# Patient Record
Sex: FEMALE | Race: WHITE | NOT HISPANIC OR LATINO | Employment: FULL TIME | URBAN - METROPOLITAN AREA
[De-identification: names, ages, dates, MRNs, and addresses within clinical notes are randomized per-mention and may not be internally consistent; named-entity substitution may affect disease eponyms.]

---

## 2023-05-25 ENCOUNTER — LAB (OUTPATIENT)
Dept: LAB | Facility: HOSPITAL | Age: 48
End: 2023-05-25
Payer: COMMERCIAL

## 2023-05-25 ENCOUNTER — OFFICE VISIT (OUTPATIENT)
Dept: FAMILY MEDICINE CLINIC | Facility: CLINIC | Age: 48
End: 2023-05-25
Payer: COMMERCIAL

## 2023-05-25 VITALS
HEIGHT: 67 IN | BODY MASS INDEX: 41.75 KG/M2 | TEMPERATURE: 97.5 F | HEART RATE: 106 BPM | RESPIRATION RATE: 16 BRPM | DIASTOLIC BLOOD PRESSURE: 88 MMHG | OXYGEN SATURATION: 99 % | SYSTOLIC BLOOD PRESSURE: 138 MMHG | WEIGHT: 266 LBS

## 2023-05-25 DIAGNOSIS — Z00.00 ANNUAL PHYSICAL EXAM: Primary | ICD-10-CM

## 2023-05-25 DIAGNOSIS — Z12.31 ENCOUNTER FOR SCREENING MAMMOGRAM FOR MALIGNANT NEOPLASM OF BREAST: ICD-10-CM

## 2023-05-25 DIAGNOSIS — Z11.59 NEED FOR HEPATITIS C SCREENING TEST: ICD-10-CM

## 2023-05-25 DIAGNOSIS — Z00.00 ANNUAL PHYSICAL EXAM: ICD-10-CM

## 2023-05-25 DIAGNOSIS — E66.01 MORBID (SEVERE) OBESITY DUE TO EXCESS CALORIES: ICD-10-CM

## 2023-05-25 DIAGNOSIS — Z76.89 ENCOUNTER TO ESTABLISH CARE: ICD-10-CM

## 2023-05-25 DIAGNOSIS — Z12.11 COLON CANCER SCREENING: ICD-10-CM

## 2023-05-25 LAB
ALBUMIN SERPL-MCNC: 4.2 G/DL (ref 3.5–5.2)
ALBUMIN/GLOB SERPL: 1.4 G/DL
ALP SERPL-CCNC: 82 U/L (ref 39–117)
ALT SERPL W P-5'-P-CCNC: 13 U/L (ref 1–33)
ANION GAP SERPL CALCULATED.3IONS-SCNC: 10.3 MMOL/L (ref 5–15)
AST SERPL-CCNC: 14 U/L (ref 1–32)
BASOPHILS # BLD AUTO: 0.04 10*3/MM3 (ref 0–0.2)
BASOPHILS NFR BLD AUTO: 0.6 % (ref 0–1.5)
BILIRUB SERPL-MCNC: 0.5 MG/DL (ref 0–1.2)
BUN SERPL-MCNC: 12 MG/DL (ref 6–20)
BUN/CREAT SERPL: 13.6 (ref 7–25)
CALCIUM SPEC-SCNC: 9.5 MG/DL (ref 8.6–10.5)
CHLORIDE SERPL-SCNC: 103 MMOL/L (ref 98–107)
CHOLEST SERPL-MCNC: 167 MG/DL (ref 0–200)
CO2 SERPL-SCNC: 23.7 MMOL/L (ref 22–29)
CREAT SERPL-MCNC: 0.88 MG/DL (ref 0.57–1)
DEPRECATED RDW RBC AUTO: 39.3 FL (ref 37–54)
EGFRCR SERPLBLD CKD-EPI 2021: 82.2 ML/MIN/1.73
EOSINOPHIL # BLD AUTO: 0.17 10*3/MM3 (ref 0–0.4)
EOSINOPHIL NFR BLD AUTO: 2.6 % (ref 0.3–6.2)
ERYTHROCYTE [DISTWIDTH] IN BLOOD BY AUTOMATED COUNT: 12.7 % (ref 12.3–15.4)
GLOBULIN UR ELPH-MCNC: 2.9 GM/DL
GLUCOSE SERPL-MCNC: 110 MG/DL (ref 65–99)
HBA1C MFR BLD: 5.5 % (ref 4.8–5.6)
HCT VFR BLD AUTO: 39.8 % (ref 34–46.6)
HCV AB SER DONR QL: NORMAL
HDLC SERPL-MCNC: 36 MG/DL (ref 40–60)
HGB BLD-MCNC: 13.6 G/DL (ref 12–15.9)
HIV1+2 AB SER QL: NORMAL
IMM GRANULOCYTES # BLD AUTO: 0.03 10*3/MM3 (ref 0–0.05)
IMM GRANULOCYTES NFR BLD AUTO: 0.5 % (ref 0–0.5)
LDLC SERPL CALC-MCNC: 109 MG/DL (ref 0–100)
LDLC/HDLC SERPL: 2.97 {RATIO}
LYMPHOCYTES # BLD AUTO: 1.45 10*3/MM3 (ref 0.7–3.1)
LYMPHOCYTES NFR BLD AUTO: 22.1 % (ref 19.6–45.3)
MCH RBC QN AUTO: 29.4 PG (ref 26.6–33)
MCHC RBC AUTO-ENTMCNC: 34.2 G/DL (ref 31.5–35.7)
MCV RBC AUTO: 86.1 FL (ref 79–97)
MONOCYTES # BLD AUTO: 0.38 10*3/MM3 (ref 0.1–0.9)
MONOCYTES NFR BLD AUTO: 5.8 % (ref 5–12)
NEUTROPHILS NFR BLD AUTO: 4.49 10*3/MM3 (ref 1.7–7)
NEUTROPHILS NFR BLD AUTO: 68.4 % (ref 42.7–76)
NRBC BLD AUTO-RTO: 0 /100 WBC (ref 0–0.2)
PLATELET # BLD AUTO: 264 10*3/MM3 (ref 140–450)
PMV BLD AUTO: 11.5 FL (ref 6–12)
POTASSIUM SERPL-SCNC: 4.3 MMOL/L (ref 3.5–5.2)
PROT SERPL-MCNC: 7.1 G/DL (ref 6–8.5)
RBC # BLD AUTO: 4.62 10*6/MM3 (ref 3.77–5.28)
SODIUM SERPL-SCNC: 137 MMOL/L (ref 136–145)
TRIGL SERPL-MCNC: 121 MG/DL (ref 0–150)
TSH SERPL DL<=0.05 MIU/L-ACNC: 1.57 UIU/ML (ref 0.27–4.2)
VIT B12 BLD-MCNC: 302 PG/ML (ref 211–946)
VLDLC SERPL-MCNC: 22 MG/DL (ref 5–40)
WBC NRBC COR # BLD: 6.56 10*3/MM3 (ref 3.4–10.8)

## 2023-05-25 PROCEDURE — 86803 HEPATITIS C AB TEST: CPT

## 2023-05-25 PROCEDURE — 80061 LIPID PANEL: CPT

## 2023-05-25 PROCEDURE — 85025 COMPLETE CBC W/AUTO DIFF WBC: CPT

## 2023-05-25 PROCEDURE — 83036 HEMOGLOBIN GLYCOSYLATED A1C: CPT

## 2023-05-25 PROCEDURE — 36415 COLL VENOUS BLD VENIPUNCTURE: CPT

## 2023-05-25 PROCEDURE — 82607 VITAMIN B-12: CPT

## 2023-05-25 PROCEDURE — 84443 ASSAY THYROID STIM HORMONE: CPT

## 2023-05-25 PROCEDURE — G0432 EIA HIV-1/HIV-2 SCREEN: HCPCS | Performed by: STUDENT IN AN ORGANIZED HEALTH CARE EDUCATION/TRAINING PROGRAM

## 2023-05-25 PROCEDURE — 80053 COMPREHEN METABOLIC PANEL: CPT

## 2023-05-25 RX ORDER — DOXYCYCLINE 50 MG/1
CAPSULE ORAL
COMMUNITY
Start: 2023-05-09 | End: 2023-05-25

## 2023-05-25 NOTE — PROGRESS NOTES
"Chief Complaint    Establishing care for annual physical  Subjective         Delores Villarreal is a 48 y.o. female who presents to [facility]    48 years old comes to the clinic today to establish care for annual physical.    Patient is physically very active without any chest pain or shortness of breath.    Currently not on any prescription medication, patient does not have any medical conditions.    Patient does report her concerns with her weight and she has been trying very hard with diet and exercise to control but so far not so much success.    12+ review of systems are unremarkable    Objective   Vital Signs:   Vitals:    05/25/23 0750   BP: 138/88   BP Location: Left arm   Patient Position: Sitting   Cuff Size: Large Adult   Pulse: 106   Resp: 16   Temp: 97.5 °F (36.4 °C)   TempSrc: Temporal   SpO2: 99%   Weight: 121 kg (266 lb)   Height: 170.2 cm (67\")      Body mass index is 41.66 kg/m².   Wt Readings from Last 3 Encounters:   05/25/23 121 kg (266 lb)      BP Readings from Last 3 Encounters:   05/25/23 138/88        Patient Care Team:  Marsha Witt MD as PCP - General (Family Medicine)     Physical Exam  Vitals reviewed.   Constitutional:       Appearance: Normal appearance. She is well-developed.   HENT:      Head: Normocephalic and atraumatic.      Right Ear: External ear normal.      Left Ear: External ear normal.      Mouth/Throat:      Pharynx: No oropharyngeal exudate.   Eyes:      Conjunctiva/sclera: Conjunctivae normal.      Pupils: Pupils are equal, round, and reactive to light.   Cardiovascular:      Rate and Rhythm: Normal rate and regular rhythm.      Heart sounds: No murmur heard.    No friction rub. No gallop.   Pulmonary:      Effort: Pulmonary effort is normal.      Breath sounds: Normal breath sounds. No wheezing or rhonchi.   Abdominal:      General: Bowel sounds are normal. There is no distension.      Palpations: Abdomen is soft.      Tenderness: There " is no abdominal tenderness.   Skin:     General: Skin is warm and dry.   Neurological:      Mental Status: She is alert and oriented to person, place, and time.      Cranial Nerves: No cranial nerve deficit.   Psychiatric:         Mood and Affect: Mood and affect normal.         Behavior: Behavior normal.         Thought Content: Thought content normal.         Judgment: Judgment normal.              Class 3 Severe Obesity (BMI >=40). Obesity-related health conditions include the following: obstructive sleep apnea, hypertension, coronary heart disease and diabetes mellitus. Obesity is unchanged. BMI is is above average; BMI management plan is completed. We discussed portion control and increasing exercise.         Assessment and Plan   Diagnoses and all orders for this visit:    1. Annual physical exam (Primary)  Comments:  Daily exercise and healthy diet recommended  Orders:  -     TSH Rfx On Abnormal To Free T4; Future  -     CBC & Differential; Future  -     Comprehensive Metabolic Panel; Future  -     Hemoglobin A1c; Future  -     Lipid Panel; Future  -     Vitamin B12; Future  -     HIV-1 / O / 2 Ag / Antibody 4th Generation    2. Encounter to establish care  -     TSH Rfx On Abnormal To Free T4; Future  -     CBC & Differential; Future  -     Comprehensive Metabolic Panel; Future  -     Hemoglobin A1c; Future  -     Lipid Panel; Future  -     Vitamin B12; Future  -     HIV-1 / O / 2 Ag / Antibody 4th Generation    3. Morbid (severe) obesity due to excess calories  Comments:  Lifestyle modifications discussed,  Orders:  -     TSH Rfx On Abnormal To Free T4; Future  -     CBC & Differential; Future  -     Comprehensive Metabolic Panel; Future  -     Hemoglobin A1c; Future  -     Lipid Panel; Future  -     Vitamin B12; Future  -     HIV-1 / O / 2 Ag / Antibody 4th Generation    4. Encounter for screening mammogram for malignant neoplasm of breast  -     Mammo Screening Digital Tomosynthesis Bilateral With CAD;  Future  -     TSH Rfx On Abnormal To Free T4; Future  -     CBC & Differential; Future  -     Comprehensive Metabolic Panel; Future  -     Hemoglobin A1c; Future  -     Lipid Panel; Future  -     Vitamin B12; Future  -     HIV-1 / O / 2 Ag / Antibody 4th Generation    5. Colon cancer screening  -     Cologuard - Stool, Per Rectum; Future  -     HIV-1 / O / 2 Ag / Antibody 4th Generation    6. Need for hepatitis C screening test  -     Hepatitis C Antibody; Future  -     HIV-1 / O / 2 Ag / Antibody 4th Generation          Tobacco Use: Unknown   • Smoking Tobacco Use: Never   • Smokeless Tobacco Use: Unknown   • Passive Exposure: Not on file            Follow Up   Return in about 1 year (around 5/25/2024) for Annual physical.  Patient was given instructions and counseling regarding her condition or for health maintenance advice. Please see specific information pulled into the AVS if appropriate.

## 2023-05-26 ENCOUNTER — PATIENT ROUNDING (BHMG ONLY) (OUTPATIENT)
Dept: FAMILY MEDICINE CLINIC | Facility: CLINIC | Age: 48
End: 2023-05-26
Payer: COMMERCIAL

## 2023-05-26 ENCOUNTER — CLINICAL SUPPORT (OUTPATIENT)
Dept: FAMILY MEDICINE CLINIC | Facility: CLINIC | Age: 48
End: 2023-05-26
Payer: COMMERCIAL

## 2023-05-26 DIAGNOSIS — E53.8 VITAMIN B12 DEFICIENCY: Primary | ICD-10-CM

## 2023-05-26 PROCEDURE — 96372 THER/PROPH/DIAG INJ SC/IM: CPT | Performed by: STUDENT IN AN ORGANIZED HEALTH CARE EDUCATION/TRAINING PROGRAM

## 2023-05-26 RX ORDER — CYANOCOBALAMIN 1000 UG/ML
1000 INJECTION, SOLUTION INTRAMUSCULAR; SUBCUTANEOUS
Status: SHIPPED | OUTPATIENT
Start: 2023-05-26

## 2023-05-26 RX ADMIN — CYANOCOBALAMIN 1000 MCG: 1000 INJECTION, SOLUTION INTRAMUSCULAR; SUBCUTANEOUS at 15:37

## 2023-06-02 ENCOUNTER — CLINICAL SUPPORT (OUTPATIENT)
Dept: FAMILY MEDICINE CLINIC | Facility: CLINIC | Age: 48
End: 2023-06-02

## 2023-06-02 DIAGNOSIS — E53.8 B12 DEFICIENCY: ICD-10-CM

## 2023-06-02 RX ADMIN — CYANOCOBALAMIN 1000 MCG: 1000 INJECTION, SOLUTION INTRAMUSCULAR; SUBCUTANEOUS at 09:51

## 2023-06-09 ENCOUNTER — CLINICAL SUPPORT (OUTPATIENT)
Dept: FAMILY MEDICINE CLINIC | Facility: CLINIC | Age: 48
End: 2023-06-09
Payer: COMMERCIAL

## 2023-06-09 DIAGNOSIS — E53.8 VITAMIN B12 DEFICIENCY: Primary | ICD-10-CM

## 2023-06-09 RX ADMIN — CYANOCOBALAMIN 1000 MCG: 1000 INJECTION, SOLUTION INTRAMUSCULAR; SUBCUTANEOUS at 15:42

## 2023-06-16 ENCOUNTER — CLINICAL SUPPORT (OUTPATIENT)
Dept: FAMILY MEDICINE CLINIC | Facility: CLINIC | Age: 48
End: 2023-06-16
Payer: COMMERCIAL

## 2023-06-16 DIAGNOSIS — E53.8 B12 DEFICIENCY: Primary | ICD-10-CM

## 2023-06-16 PROCEDURE — 96372 THER/PROPH/DIAG INJ SC/IM: CPT | Performed by: STUDENT IN AN ORGANIZED HEALTH CARE EDUCATION/TRAINING PROGRAM

## 2023-06-16 RX ADMIN — CYANOCOBALAMIN 1000 MCG: 1000 INJECTION, SOLUTION INTRAMUSCULAR; SUBCUTANEOUS at 14:14

## 2023-07-26 ENCOUNTER — HOSPITAL ENCOUNTER (OUTPATIENT)
Dept: MAMMOGRAPHY | Facility: HOSPITAL | Age: 48
Discharge: HOME | End: 2023-07-26
Payer: COMMERCIAL

## 2023-07-26 ENCOUNTER — HOSPITAL ENCOUNTER (OUTPATIENT)
Dept: ULTRASOUND IMAGING | Facility: HOSPITAL | Age: 48
Discharge: HOME | End: 2023-07-26
Payer: COMMERCIAL

## 2023-07-26 DIAGNOSIS — R92.8 ABNORMAL MAMMOGRAM: Primary | ICD-10-CM

## 2023-07-26 DIAGNOSIS — R92.8 ABNORMAL MAMMOGRAM OF BOTH BREASTS: ICD-10-CM

## 2023-07-26 PROCEDURE — G0279 TOMOSYNTHESIS, MAMMO: HCPCS

## 2023-07-26 PROCEDURE — 77066 DX MAMMO INCL CAD BI: CPT

## 2023-07-26 PROCEDURE — 76642 ULTRASOUND BREAST LIMITED: CPT

## 2024-01-22 ENCOUNTER — HOSPITAL ENCOUNTER (OUTPATIENT)
Dept: ULTRASOUND IMAGING | Facility: HOSPITAL | Age: 49
Discharge: HOME | End: 2024-01-22
Payer: COMMERCIAL

## 2024-01-22 ENCOUNTER — HOSPITAL ENCOUNTER (OUTPATIENT)
Dept: MAMMOGRAPHY | Facility: HOSPITAL | Age: 49
Discharge: HOME | End: 2024-01-22
Payer: COMMERCIAL

## 2024-01-22 DIAGNOSIS — R92.8 ABNORMAL MAMMOGRAM: ICD-10-CM

## 2024-01-22 PROCEDURE — 77066 DX MAMMO INCL CAD BI: CPT

## 2024-01-22 PROCEDURE — G0279 TOMOSYNTHESIS, MAMMO: HCPCS

## 2024-01-22 PROCEDURE — 76642 ULTRASOUND BREAST LIMITED: CPT

## 2024-01-29 ENCOUNTER — TELEPHONE (OUTPATIENT)
Dept: FAMILY MEDICINE CLINIC | Facility: CLINIC | Age: 49
End: 2024-01-29
Payer: COMMERCIAL

## 2024-01-29 NOTE — TELEPHONE ENCOUNTER
Lvmtcb     Needing to speak with patient regarding upcoming appointment on 1/30/24. Please transfer to office.

## 2024-01-30 ENCOUNTER — OFFICE VISIT (OUTPATIENT)
Dept: FAMILY MEDICINE CLINIC | Facility: CLINIC | Age: 49
End: 2024-01-30
Payer: COMMERCIAL

## 2024-01-30 VITALS
HEART RATE: 103 BPM | BODY MASS INDEX: 41.75 KG/M2 | HEIGHT: 67 IN | RESPIRATION RATE: 16 BRPM | TEMPERATURE: 96.9 F | OXYGEN SATURATION: 100 % | SYSTOLIC BLOOD PRESSURE: 126 MMHG | DIASTOLIC BLOOD PRESSURE: 90 MMHG | WEIGHT: 266 LBS

## 2024-01-30 DIAGNOSIS — Z82.49 FAMILY HISTORY OF HEART DISEASE: ICD-10-CM

## 2024-01-30 DIAGNOSIS — R92.8 ABNORMAL MAMMOGRAM OF RIGHT BREAST: ICD-10-CM

## 2024-01-30 DIAGNOSIS — E53.8 VITAMIN B 12 DEFICIENCY: ICD-10-CM

## 2024-01-30 DIAGNOSIS — F41.9 ANXIETY: Primary | ICD-10-CM

## 2024-01-30 DIAGNOSIS — Z00.00 ANNUAL PHYSICAL EXAM: ICD-10-CM

## 2024-01-30 RX ORDER — CHOLECALCIFEROL (VITAMIN D3) 125 MCG
500 CAPSULE ORAL DAILY
Qty: 90 TABLET | Refills: 1 | Status: SHIPPED | OUTPATIENT
Start: 2024-01-30

## 2024-01-30 RX ORDER — ASPIRIN 81 MG/1
81 TABLET ORAL DAILY
COMMUNITY

## 2024-01-30 RX ADMIN — CYANOCOBALAMIN 1000 MCG: 1000 INJECTION, SOLUTION INTRAMUSCULAR; SUBCUTANEOUS at 11:16

## 2024-01-30 NOTE — PROGRESS NOTES
"Chief Complaint  Anxiety and B12 deficiency     Subjective         Delores Villarreal is a 48 y.o. female who presents to [facility]    48 years old comes to the clinic today to follow-up.    Recently had abnormal diagnostic mammogram of right breast and biopsy scheduled.    Patient does have B12 deficiency, had taken few shots in the past but currently not taking any shots or any oral medication.    Patient does report some generalized anxiety and anxious mood due to lots of things and stress.  Would like to start something for anxiety as a trial.    Patient has a strong family history of cardiac disease.  Mother had 3 cardiac stents placed recently at age 70+ and that is giving patient lots of anxiety.  Patient would like to see cardiologist    12+ review of systems are unremarkable otherwise    Objective   Vital Signs:   Vitals:    01/30/24 1033   BP: 126/90   Pulse: 103   Resp: 16   Temp: 96.9 °F (36.1 °C)   SpO2: 100%   Weight: 121 kg (266 lb)   Height: 170.2 cm (67\")      Body mass index is 41.66 kg/m².   Wt Readings from Last 3 Encounters:   01/30/24 121 kg (266 lb)   05/25/23 121 kg (266 lb)      BP Readings from Last 3 Encounters:   01/30/24 126/90   05/25/23 138/88        Patient Care Team:  Marsha Witt MD as PCP - General (Family Medicine)     Physical Exam  Vitals reviewed.   Constitutional:       Appearance: Normal appearance. She is well-developed.   HENT:      Head: Normocephalic and atraumatic.      Right Ear: External ear normal.      Left Ear: External ear normal.      Mouth/Throat:      Pharynx: No oropharyngeal exudate.   Eyes:      Conjunctiva/sclera: Conjunctivae normal.      Pupils: Pupils are equal, round, and reactive to light.   Cardiovascular:      Rate and Rhythm: Normal rate and regular rhythm.      Heart sounds: No murmur heard.     No friction rub. No gallop.   Pulmonary:      Effort: Pulmonary effort is normal.      Breath sounds: Normal breath " sounds. No wheezing or rhonchi.   Abdominal:      General: Bowel sounds are normal. There is no distension.      Palpations: Abdomen is soft.      Tenderness: There is no abdominal tenderness.   Skin:     General: Skin is warm and dry.   Neurological:      Mental Status: She is alert and oriented to person, place, and time.      Cranial Nerves: No cranial nerve deficit.   Psychiatric:         Mood and Affect: Mood and affect normal.         Behavior: Behavior normal.         Thought Content: Thought content normal.         Judgment: Judgment normal.                            Assessment and Plan   Diagnoses and all orders for this visit:    1. Anxiety (Primary)  Comments:  Will start patient on Zoloft, counseling provided.  Medication reviewed, may titrate up if needed.  Patient to reach out if uncontrolled symptoms or no improv  Orders:  -     sertraline (Zoloft) 50 MG tablet; Take 1 tablet by mouth Daily.  Dispense: 30 tablet; Refill: 1    2. Vitamin B 12 deficiency  Comments:  Will prescribe oral B12  Orders:  -     vitamin B-12 (CYANOCOBALAMIN) 500 MCG tablet; Take 1 tablet by mouth Daily.  Dispense: 90 tablet; Refill: 1  -     TSH Rfx On Abnormal To Free T4; Future  -     CBC & Differential; Future  -     Comprehensive Metabolic Panel; Future  -     Hemoglobin A1c; Future  -     Lipid Panel; Future  -     Urinalysis With Microscopic - Urine, Clean Catch; Future    3. Abnormal mammogram of right breast  Comments:  Biopsy pending  Orders:  -     TSH Rfx On Abnormal To Free T4; Future  -     CBC & Differential; Future  -     Comprehensive Metabolic Panel; Future  -     Hemoglobin A1c; Future  -     Lipid Panel; Future  -     Urinalysis With Microscopic - Urine, Clean Catch; Future    4. Family history of heart disease  Comments:  Cardiology referral  Orders:  -     Ambulatory Referral to Cardiology  -     TSH Rfx On Abnormal To Free T4; Future  -     CBC & Differential; Future  -     Comprehensive Metabolic Panel;  Future  -     Hemoglobin A1c; Future  -     Lipid Panel; Future  -     Urinalysis With Microscopic - Urine, Clean Catch; Future    5. Annual physical exam  -     TSH Rfx On Abnormal To Free T4; Future  -     CBC & Differential; Future  -     Comprehensive Metabolic Panel; Future  -     Hemoglobin A1c; Future  -     Lipid Panel; Future  -     Urinalysis With Microscopic - Urine, Clean Catch; Future          Tobacco Use: Unknown (1/30/2024)    Patient History     Smoking Tobacco Use: Never     Smokeless Tobacco Use: Unknown     Passive Exposure: Not on file            Follow Up   Return in about 6 months (around 7/30/2024) for Next scheduled follow up.  Patient was given instructions and counseling regarding her condition or for health maintenance advice. Please see specific information pulled into the AVS if appropriate.

## 2024-02-05 ENCOUNTER — PATIENT OUTREACH (OUTPATIENT)
Dept: ONCOLOGY | Facility: HOSPITAL | Age: 49
End: 2024-02-05
Payer: COMMERCIAL

## 2024-02-05 ENCOUNTER — HOSPITAL ENCOUNTER (OUTPATIENT)
Dept: MAMMOGRAPHY | Facility: HOSPITAL | Age: 49
Discharge: HOME | End: 2024-02-05
Payer: COMMERCIAL

## 2024-02-05 DIAGNOSIS — N63.11 MASS OF UPPER OUTER QUADRANT OF RIGHT BREAST: ICD-10-CM

## 2024-02-05 PROCEDURE — 88305 TISSUE EXAM BY PATHOLOGIST: CPT | Performed by: STUDENT IN AN ORGANIZED HEALTH CARE EDUCATION/TRAINING PROGRAM

## 2024-02-05 PROCEDURE — 25010000002 LIDOCAINE 1 % SOLUTION: Performed by: STUDENT IN AN ORGANIZED HEALTH CARE EDUCATION/TRAINING PROGRAM

## 2024-02-05 PROCEDURE — A4648 IMPLANTABLE TISSUE MARKER: HCPCS

## 2024-02-05 RX ORDER — LIDOCAINE HYDROCHLORIDE AND EPINEPHRINE 10; 10 MG/ML; UG/ML
10 INJECTION, SOLUTION INFILTRATION; PERINEURAL ONCE
Status: COMPLETED | OUTPATIENT
Start: 2024-02-05 | End: 2024-02-05

## 2024-02-05 RX ORDER — LIDOCAINE HYDROCHLORIDE 10 MG/ML
10 INJECTION, SOLUTION INFILTRATION; PERINEURAL ONCE
Status: COMPLETED | OUTPATIENT
Start: 2024-02-05 | End: 2024-02-05

## 2024-02-05 RX ADMIN — LIDOCAINE HYDROCHLORIDE,EPINEPHRINE BITARTRATE 10 ML: 10; .01 INJECTION, SOLUTION INFILTRATION; PERINEURAL at 13:35

## 2024-02-05 RX ADMIN — LIDOCAINE HYDROCHLORIDE 10 ML: 10 INJECTION, SOLUTION INFILTRATION; PERINEURAL at 13:35

## 2024-02-06 LAB
CYTO UR: NORMAL
LAB AP CASE REPORT: NORMAL
LAB AP CLINICAL INFORMATION: NORMAL
PATH REPORT.FINAL DX SPEC: NORMAL
PATH REPORT.GROSS SPEC: NORMAL

## 2024-02-07 DIAGNOSIS — R92.8 ABNORMAL MAMMOGRAM OF BOTH BREASTS: ICD-10-CM

## 2024-02-07 DIAGNOSIS — D24.9 FIBROADENOMA OF BREAST, UNSPECIFIED LATERALITY: Primary | ICD-10-CM

## 2024-02-14 ENCOUNTER — PROCEDURE VISIT (OUTPATIENT)
Dept: FAMILY MEDICINE CLINIC | Facility: CLINIC | Age: 49
End: 2024-02-14
Payer: COMMERCIAL

## 2024-02-14 VITALS
TEMPERATURE: 98.3 F | HEART RATE: 110 BPM | OXYGEN SATURATION: 99 % | DIASTOLIC BLOOD PRESSURE: 84 MMHG | WEIGHT: 265.1 LBS | HEIGHT: 67 IN | BODY MASS INDEX: 41.61 KG/M2 | SYSTOLIC BLOOD PRESSURE: 132 MMHG

## 2024-02-14 DIAGNOSIS — Z01.419 WOMEN'S ANNUAL ROUTINE GYNECOLOGICAL EXAMINATION: Primary | ICD-10-CM

## 2024-02-14 DIAGNOSIS — E53.8 VITAMIN B 12 DEFICIENCY: ICD-10-CM

## 2024-02-14 DIAGNOSIS — N92.1 MENORRHAGIA WITH IRREGULAR CYCLE: ICD-10-CM

## 2024-02-14 LAB
CANDIDA SPECIES: NEGATIVE
GARDNERELLA VAGINALIS: POSITIVE
T VAGINALIS DNA VAG QL PROBE+SIG AMP: NEGATIVE

## 2024-02-14 PROCEDURE — 87510 GARDNER VAG DNA DIR PROBE: CPT

## 2024-02-14 PROCEDURE — 87480 CANDIDA DNA DIR PROBE: CPT

## 2024-02-14 PROCEDURE — 87660 TRICHOMONAS VAGIN DIR PROBE: CPT

## 2024-02-14 PROCEDURE — 87624 HPV HI-RISK TYP POOLED RSLT: CPT

## 2024-02-14 PROCEDURE — G0123 SCREEN CERV/VAG THIN LAYER: HCPCS

## 2024-02-14 RX ORDER — CYANOCOBALAMIN 1000 UG/ML
1000 INJECTION, SOLUTION INTRAMUSCULAR; SUBCUTANEOUS ONCE
Status: COMPLETED | OUTPATIENT
Start: 2024-02-14 | End: 2024-02-14

## 2024-02-14 RX ADMIN — CYANOCOBALAMIN 1000 MCG: 1000 INJECTION, SOLUTION INTRAMUSCULAR; SUBCUTANEOUS at 07:35

## 2024-02-15 RX ORDER — METRONIDAZOLE 500 MG/1
500 TABLET ORAL 2 TIMES DAILY
Qty: 14 TABLET | Refills: 0 | Status: SHIPPED | OUTPATIENT
Start: 2024-02-15

## 2024-02-15 RX ORDER — FLUCONAZOLE 150 MG/1
150 TABLET ORAL ONCE
Qty: 1 TABLET | Refills: 0 | Status: SHIPPED | OUTPATIENT
Start: 2024-02-15 | End: 2024-02-15

## 2024-02-17 LAB
CYTOLOGIST CVX/VAG CYTO: NORMAL
CYTOLOGY CVX/VAG DOC CYTO: NORMAL
CYTOLOGY CVX/VAG DOC THIN PREP: NORMAL
DX ICD CODE: NORMAL
HIV 1 & 2 AB SER-IMP: NORMAL
HPV I/H RISK 4 DNA CVX QL PROBE+SIG AMP: NEGATIVE
OTHER STN SPEC: NORMAL
STAT OF ADQ CVX/VAG CYTO-IMP: NORMAL

## 2024-03-12 ENCOUNTER — PATIENT MESSAGE (OUTPATIENT)
Dept: FAMILY MEDICINE CLINIC | Facility: CLINIC | Age: 49
End: 2024-03-12
Payer: COMMERCIAL

## 2024-03-12 NOTE — TELEPHONE ENCOUNTER
From: Delores Villarreal  To: Marsha Witt  Sent: 3/12/2024 10:59 AM EDT  Subject: Insurance     We may be switching Insurance companies with my husbands new job and I wanted to make sure you all accepted [employer] insurance?

## 2024-06-27 ENCOUNTER — OFFICE VISIT (OUTPATIENT)
Dept: FAMILY MEDICINE CLINIC | Facility: CLINIC | Age: 49
End: 2024-06-27
Payer: COMMERCIAL

## 2024-06-27 ENCOUNTER — LAB (OUTPATIENT)
Dept: LAB | Facility: HOSPITAL | Age: 49
End: 2024-06-27
Payer: COMMERCIAL

## 2024-06-27 VITALS
WEIGHT: 265 LBS | OXYGEN SATURATION: 99 % | BODY MASS INDEX: 41.59 KG/M2 | TEMPERATURE: 97.4 F | HEIGHT: 67 IN | DIASTOLIC BLOOD PRESSURE: 80 MMHG | SYSTOLIC BLOOD PRESSURE: 124 MMHG | HEART RATE: 100 BPM | RESPIRATION RATE: 16 BRPM

## 2024-06-27 DIAGNOSIS — E66.01 MORBID OBESITY: ICD-10-CM

## 2024-06-27 DIAGNOSIS — Z00.00 ANNUAL PHYSICAL EXAM: ICD-10-CM

## 2024-06-27 DIAGNOSIS — Z00.00 ANNUAL PHYSICAL EXAM: Primary | ICD-10-CM

## 2024-06-27 LAB
ALBUMIN SERPL-MCNC: 4.2 G/DL (ref 3.5–5.2)
ALBUMIN/GLOB SERPL: 1.4 G/DL
ALP SERPL-CCNC: 85 U/L (ref 39–117)
ALT SERPL W P-5'-P-CCNC: 12 U/L (ref 1–33)
ANION GAP SERPL CALCULATED.3IONS-SCNC: 12.2 MMOL/L (ref 5–15)
AST SERPL-CCNC: 12 U/L (ref 1–32)
BACTERIA UR QL AUTO: ABNORMAL /HPF
BASOPHILS # BLD AUTO: 0.04 10*3/MM3 (ref 0–0.2)
BASOPHILS NFR BLD AUTO: 0.6 % (ref 0–1.5)
BILIRUB SERPL-MCNC: 0.3 MG/DL (ref 0–1.2)
BILIRUB UR QL STRIP: NEGATIVE
BUN SERPL-MCNC: 14 MG/DL (ref 6–20)
BUN/CREAT SERPL: 15.2 (ref 7–25)
CALCIUM SPEC-SCNC: 9.4 MG/DL (ref 8.6–10.5)
CHLORIDE SERPL-SCNC: 104 MMOL/L (ref 98–107)
CHOLEST SERPL-MCNC: 179 MG/DL (ref 0–200)
CLARITY UR: CLEAR
CO2 SERPL-SCNC: 22.8 MMOL/L (ref 22–29)
COLOR UR: YELLOW
CREAT SERPL-MCNC: 0.92 MG/DL (ref 0.57–1)
DEPRECATED RDW RBC AUTO: 39.3 FL (ref 37–54)
EGFRCR SERPLBLD CKD-EPI 2021: 77.4 ML/MIN/1.73
EOSINOPHIL # BLD AUTO: 0.19 10*3/MM3 (ref 0–0.4)
EOSINOPHIL NFR BLD AUTO: 2.7 % (ref 0.3–6.2)
ERYTHROCYTE [DISTWIDTH] IN BLOOD BY AUTOMATED COUNT: 12.3 % (ref 12.3–15.4)
GLOBULIN UR ELPH-MCNC: 3.1 GM/DL
GLUCOSE SERPL-MCNC: 98 MG/DL (ref 65–99)
GLUCOSE UR STRIP-MCNC: NEGATIVE MG/DL
HBA1C MFR BLD: 5.6 % (ref 4.8–5.6)
HCT VFR BLD AUTO: 42.5 % (ref 34–46.6)
HDLC SERPL-MCNC: 45 MG/DL (ref 40–60)
HGB BLD-MCNC: 14.3 G/DL (ref 12–15.9)
HGB UR QL STRIP.AUTO: ABNORMAL
HYALINE CASTS UR QL AUTO: ABNORMAL /LPF
IMM GRANULOCYTES # BLD AUTO: 0.03 10*3/MM3 (ref 0–0.05)
IMM GRANULOCYTES NFR BLD AUTO: 0.4 % (ref 0–0.5)
KETONES UR QL STRIP: NEGATIVE
LDLC SERPL CALC-MCNC: 112 MG/DL (ref 0–100)
LDLC/HDLC SERPL: 2.44 {RATIO}
LEUKOCYTE ESTERASE UR QL STRIP.AUTO: ABNORMAL
LYMPHOCYTES # BLD AUTO: 1.77 10*3/MM3 (ref 0.7–3.1)
LYMPHOCYTES NFR BLD AUTO: 24.9 % (ref 19.6–45.3)
MCH RBC QN AUTO: 29.5 PG (ref 26.6–33)
MCHC RBC AUTO-ENTMCNC: 33.6 G/DL (ref 31.5–35.7)
MCV RBC AUTO: 87.8 FL (ref 79–97)
MONOCYTES # BLD AUTO: 0.43 10*3/MM3 (ref 0.1–0.9)
MONOCYTES NFR BLD AUTO: 6 % (ref 5–12)
NEUTROPHILS NFR BLD AUTO: 4.65 10*3/MM3 (ref 1.7–7)
NEUTROPHILS NFR BLD AUTO: 65.4 % (ref 42.7–76)
NITRITE UR QL STRIP: NEGATIVE
NRBC BLD AUTO-RTO: 0 /100 WBC (ref 0–0.2)
PH UR STRIP.AUTO: 6 [PH] (ref 5–8)
PLATELET # BLD AUTO: 294 10*3/MM3 (ref 140–450)
PMV BLD AUTO: 11.2 FL (ref 6–12)
POTASSIUM SERPL-SCNC: 4.3 MMOL/L (ref 3.5–5.2)
PROT SERPL-MCNC: 7.3 G/DL (ref 6–8.5)
PROT UR QL STRIP: NEGATIVE
RBC # BLD AUTO: 4.84 10*6/MM3 (ref 3.77–5.28)
RBC # UR STRIP: ABNORMAL /HPF
REF LAB TEST METHOD: ABNORMAL
SODIUM SERPL-SCNC: 139 MMOL/L (ref 136–145)
SP GR UR STRIP: 1.01 (ref 1–1.03)
SQUAMOUS #/AREA URNS HPF: ABNORMAL /HPF
TRIGL SERPL-MCNC: 121 MG/DL (ref 0–150)
TSH SERPL DL<=0.05 MIU/L-ACNC: 2.45 UIU/ML (ref 0.27–4.2)
UROBILINOGEN UR QL STRIP: ABNORMAL
VLDLC SERPL-MCNC: 22 MG/DL (ref 5–40)
WBC # UR STRIP: ABNORMAL /HPF
WBC NRBC COR # BLD AUTO: 7.11 10*3/MM3 (ref 3.4–10.8)

## 2024-06-27 PROCEDURE — 84443 ASSAY THYROID STIM HORMONE: CPT

## 2024-06-27 PROCEDURE — 80061 LIPID PANEL: CPT

## 2024-06-27 PROCEDURE — 80053 COMPREHEN METABOLIC PANEL: CPT

## 2024-06-27 PROCEDURE — 85025 COMPLETE CBC W/AUTO DIFF WBC: CPT

## 2024-06-27 PROCEDURE — 36415 COLL VENOUS BLD VENIPUNCTURE: CPT

## 2024-06-27 PROCEDURE — 81001 URINALYSIS AUTO W/SCOPE: CPT

## 2024-06-27 PROCEDURE — 99396 PREV VISIT EST AGE 40-64: CPT | Performed by: STUDENT IN AN ORGANIZED HEALTH CARE EDUCATION/TRAINING PROGRAM

## 2024-06-27 PROCEDURE — 83036 HEMOGLOBIN GLYCOSYLATED A1C: CPT

## 2024-06-27 NOTE — PROGRESS NOTES
"Chief Complaint  Annual Exam    Subjective         Delores Villarreal is a 49 y.o. female who presents to [facility]    49 years old comes to the clinic today for annual physical.    Compliant with B12    Patient is due for blood work.    Has mammogram pending for next year, diagnostic mammogram in January.    12+ review of systems are unremarkable, normal Pap smear.        Objective   Vital Signs:   Vitals:    06/27/24 0705   BP: 124/80   Pulse: 100   Resp: 16   Temp: 97.4 °F (36.3 °C)   SpO2: 99%   Weight: 120 kg (265 lb)   Height: 170.2 cm (67\")      Body mass index is 41.5 kg/m².   Wt Readings from Last 3 Encounters:   06/27/24 120 kg (265 lb)   02/14/24 120 kg (265 lb 1.6 oz)   01/30/24 121 kg (266 lb)      BP Readings from Last 3 Encounters:   06/27/24 124/80   02/14/24 132/84   01/30/24 126/90        Patient Care Team:  Marsha Witt MD as PCP - General (Family Medicine)     Physical Exam  Vitals reviewed.   Constitutional:       Appearance: Normal appearance. She is well-developed.   HENT:      Head: Normocephalic and atraumatic.      Right Ear: External ear normal.      Left Ear: External ear normal.      Mouth/Throat:      Pharynx: No oropharyngeal exudate.   Eyes:      Conjunctiva/sclera: Conjunctivae normal.      Pupils: Pupils are equal, round, and reactive to light.   Cardiovascular:      Rate and Rhythm: Normal rate and regular rhythm.      Heart sounds: No murmur heard.     No friction rub. No gallop.   Pulmonary:      Effort: Pulmonary effort is normal.      Breath sounds: Normal breath sounds. No wheezing or rhonchi.   Abdominal:      General: Bowel sounds are normal. There is no distension.      Palpations: Abdomen is soft.      Tenderness: There is no abdominal tenderness.   Skin:     General: Skin is warm and dry.   Neurological:      Mental Status: She is alert and oriented to person, place, and time.      Cranial Nerves: No cranial nerve deficit.   Psychiatric:  "        Mood and Affect: Mood and affect normal.         Behavior: Behavior normal.         Thought Content: Thought content normal.         Judgment: Judgment normal.          Class 3 Severe Obesity (BMI >=40). Obesity-related health conditions include the following: obstructive sleep apnea, hypertension, and coronary heart disease. Obesity is unchanged. BMI is is above average; BMI management plan is completed. We discussed portion control and increasing exercise.              Assessment and Plan   Diagnoses and all orders for this visit:    1. Annual physical exam (Primary)  Comments:  Daily exercise and healthy diet recommended  Orders:  -     TSH Rfx On Abnormal To Free T4; Future  -     CBC & Differential; Future  -     Comprehensive Metabolic Panel; Future  -     Hemoglobin A1c; Future  -     Lipid Panel; Future  -     Urinalysis With Microscopic - Urine, Clean Catch; Future    2. Morbid obesity  Comments:  Lifestyle modifications and pharmacologic management options discussed          Tobacco Use: Low Risk  (6/27/2024)    Patient History    • Smoking Tobacco Use: Never    • Smokeless Tobacco Use: Never    • Passive Exposure: Never            Follow Up   Return in about 1 year (around 7/2/2025).  Patient was given instructions and counseling regarding her condition or for health maintenance advice. Please see specific information pulled into the AVS if appropriate.

## 2024-08-06 ENCOUNTER — OFFICE VISIT (OUTPATIENT)
Dept: FAMILY MEDICINE CLINIC | Facility: CLINIC | Age: 49
End: 2024-08-06
Payer: COMMERCIAL

## 2024-08-06 VITALS
OXYGEN SATURATION: 98 % | BODY MASS INDEX: 41.72 KG/M2 | DIASTOLIC BLOOD PRESSURE: 80 MMHG | SYSTOLIC BLOOD PRESSURE: 132 MMHG | HEIGHT: 67 IN | TEMPERATURE: 98.4 F | HEART RATE: 103 BPM | WEIGHT: 265.8 LBS

## 2024-08-06 DIAGNOSIS — N92.1 MENORRHAGIA WITH IRREGULAR CYCLE: ICD-10-CM

## 2024-08-06 DIAGNOSIS — R10.2 PELVIC PAIN: Primary | ICD-10-CM

## 2024-08-06 PROCEDURE — 99214 OFFICE O/P EST MOD 30 MIN: CPT

## 2024-08-06 RX ORDER — IBUPROFEN 800 MG/1
800 TABLET ORAL EVERY 8 HOURS PRN
Qty: 60 TABLET | Refills: 0 | Status: SHIPPED | OUTPATIENT
Start: 2024-08-06

## 2024-08-06 NOTE — PROGRESS NOTES
Delores Villarreal presents to [facility] with complaints of excessive vaginal bleeding, severe pelvic pain.      History of Present Illness  This is a 49-year-old female who presents to clinic with complaints of excessive vaginal bleeding, and severe pelvic pain.    Patient states that she has noticed this severe menstrual bleeding and excessive menstrual bleeding that has started really this year.  Patient states that her periods normally come around the seventh of every month, they have been somewhat regular but also irregular at the same time.  States there have been a few times that it has been late.  Patient states that her biggest complaint is the first 2 to 3 days, she is having to wear 3 or 4 pads at a time, completely saturate those, and she has to change them every hour.  States that this is very abnormal for her, and she is also noted that she feels like an extreme zombie during that timeframe and is exhausted.  States this morning it really concerned her though.  States that she woke up, knowing that she is likely to start her period in the next 1 to 2 days, and states that she had such excruciating pain in her right pelvic/ovarian area that she almost went to the ER.  Patient states that it was 40 minutes worth of excruciating pain, was like a stabbing pain that was being sent from her vaginal area up into her right ovary.  States it almost took her breath, she could not move and she almost felt paralyzed.  Patient states that the pain was so severe that she finally was able to get to get it to break, over-the-counter Advil gave her little relief, but patient states she is not sure what is going on but just feels different.  Has not had this evaluated, has not seen OB/GYN in some time.  Pap smear performed in February was normal.    The following portions of the patient's history were personally reviewed and updated as appropriate: allergies, current medications, past  "medical history, past surgical history, past family history, and past social history.       Objective   Vital Signs:   /80 (BP Location: Left arm, Patient Position: Sitting, Cuff Size: Large Adult)   Pulse 103   Temp 98.4 °F (36.9 °C)   Ht 170.2 cm (67\")   Wt 121 kg (265 lb 12.8 oz)   SpO2 98%   BMI 41.63 kg/m²     Body mass index is 41.63 kg/m².    All labs, imaging, test results, and specialty provider notes reviewed with patient.     Physical Exam  Vitals reviewed.   Constitutional:       Appearance: Normal appearance.   Cardiovascular:      Rate and Rhythm: Normal rate and regular rhythm.      Pulses: Normal pulses.      Heart sounds: Normal heart sounds.   Pulmonary:      Effort: Pulmonary effort is normal.      Breath sounds: Normal breath sounds.   Neurological:      General: No focal deficit present.      Mental Status: She is alert and oriented to person, place, and time.            Assessment and Plan:  Diagnoses and all orders for this visit:    1. Pelvic pain (Primary)  -     US Non-ob Transvaginal; Future  -     ibuprofen (ADVIL,MOTRIN) 800 MG tablet; Take 1 tablet by mouth Every 8 (Eight) Hours As Needed for Mild Pain.  Dispense: 60 tablet; Refill: 0  -     Ambulatory Referral to Obstetrics / Gynecology    2. Menorrhagia with irregular cycle  -     US Non-ob Transvaginal; Future  -     Ambulatory Referral to Obstetrics / Gynecology      We will start with obtaining a stat transvaginal ultrasound to further evaluate and rule out abnormalities that could be contributing to patient's both pelvic pain along with heavy menstrual cycle.  Will also get patient set back up with OB/GYN.  Provide ibuprofen that she can use as needed for this to feel pelvic pain.  Discussed ER precautions and when to seek emergency evaluation if symptoms were failing to improve with current workup and treatment at this time.      Follow Up:  No follow-ups on file.    Patient was given instructions and counseling " regarding her condition or for health maintenance advice. Please see specific information pulled into the AVS if appropriate.

## 2024-10-28 NOTE — PROGRESS NOTES
"GYN Visit    Chief Complaint   Patient presents with    Fibroids     US done  at [place]        HPI:   49 y.o. with LMP 10/26/2024 here for pelvic pain and uterine fibroid.  Menses monthly x 4 to 5 days with 2 to 3 days heavy uses 2-3 pads at a time changing every 2-3 hours soaked.  No clotting.+ Cramping that begins 1 to 2 days before her menses.  It is relieved with Motrin.  She had an episode of severe pain in August that lasted 40 minutes it began in the vaginal area and radiated up into her side.  She saw her nurse practitioner who had an ultrasound ordered and the ultrasound revealed fibroid she was referred here.  Patient has had no new complaints.      History: PMHx, Meds, Allergies, PSHx, Social Hx, and POBHx all reviewed and updated.    PHYSICAL EXAM:  /83   Pulse 101   Ht 170.2 cm (67\")   Wt 120 kg (264 lb)   LMP 10/26/2024 Comment: heavy flow, changing products every few hrs  Breastfeeding No   BMI 41.35 kg/m²  Chaperone present during exam.   General- NAD, alert and oriented, appropriate  Psych- Normal mood, good memory      External genitalia- Normal female, no lesions  Urethra/meatus- Normal, no masses, non tender, no prolapse  Bladder- Normal, no masses, non tender, no prolapse  Vagina- Normal, no atrophy, no lesions, no discharge, no prolapse scant brownish blood in the vault  Cvx- Normal, no lesions, no discharge, No cervical motion tenderness  Uterus- Normal size, shape & consistency.  Non tender, mobile.  Adnexa- No mass, non tender  Anus/Rectum/Perineum- Not performed    Lymphatic- No palpable neck, axillary, or groin nodes  Ext- No edema, no cyanosis    Skin- No lesions, no rashes, no acanthosis nigricans      Outside records from primary provider reviewed    RADIOLOGY - SCAN - US NON-OB TRANSVAGINAL, [facility], 24 (2024)      ASSESSMENT AND PLAN:  Diagnoses and all orders for this visit:    1. Uterine leiomyoma, unspecified location (Primary)    ACOG " technical bulletin 228: Management of Symptomatic Fibroids: Uterine leiomyomas are common and estimated to occur in up to 70% of women by menopause . However, the true incidence and prevalence remain unknown because most cases are asymptomatic and likely go undiagnosed, with approximately only 25% being clinically significant enough to require intervention .The incidence of leiomyomas increases with age until menopause . Other factors that are associated with an increased risk of uterine leiomyomas include premenopausal status, family history, increasing interval since last birth, hypertension, and obesity . Factors that are associated with a decreased incidence of uterine leiomyomas include increasing parity and use of oral hormonal contraceptives or depot medroxyprogesterone acetate (DMPA) for any duration.There are a variety of treatment options for leiomyomas, including expectant, medical, interventional, and surgical therapies. Although evidence exists regarding outcomes with specific therapies, comparative effectiveness data are lacking for leiomyoma management options . When considering treatment options, patient-specific symptoms and severity should be addressed. If a patient describes symptoms that are neither severe, nor debilitating, expectant management may be appropriate.        Follow Up:  No follow-ups on file.          Trena Rosario DO  10/30/2024    [facility]  [address] [place] [address]  Dept: [phone]  Dept Fax: [phone]  Loc: [phone]  Loc Fax: [phone]

## 2024-10-30 ENCOUNTER — OFFICE VISIT (OUTPATIENT)
Dept: OBSTETRICS AND GYNECOLOGY | Facility: CLINIC | Age: 49
End: 2024-10-30
Payer: COMMERCIAL

## 2024-10-30 VITALS
HEIGHT: 67 IN | DIASTOLIC BLOOD PRESSURE: 83 MMHG | WEIGHT: 264 LBS | BODY MASS INDEX: 41.44 KG/M2 | SYSTOLIC BLOOD PRESSURE: 143 MMHG | HEART RATE: 101 BPM

## 2024-10-30 DIAGNOSIS — D25.9 UTERINE LEIOMYOMA, UNSPECIFIED LOCATION: Primary | ICD-10-CM

## 2024-10-30 PROCEDURE — 99203 OFFICE O/P NEW LOW 30 MIN: CPT | Performed by: OBSTETRICS & GYNECOLOGY

## 2024-11-04 DIAGNOSIS — E53.8 VITAMIN B 12 DEFICIENCY: ICD-10-CM

## 2024-11-04 RX ORDER — UREA 10 %
500 LOTION (ML) TOPICAL DAILY
Qty: 90 TABLET | Refills: 1 | Status: SHIPPED | OUTPATIENT
Start: 2024-11-04

## 2024-11-04 NOTE — TELEPHONE ENCOUNTER
UPCOMING APPTS  With Family Medicine (ANDERSON Ruiz)  02/17/2025 at 7:00 AM  LAST OFFICE VISIT - THIS DEPT  8/6/2024 Keke Warren APRN   recent adrenalectomy/n/a

## 2024-11-25 DIAGNOSIS — E66.01 MORBID OBESITY WITH BMI OF 40.0-44.9, ADULT: ICD-10-CM

## 2024-11-25 DIAGNOSIS — E66.01 MORBID OBESITY: Primary | ICD-10-CM

## 2025-01-07 DIAGNOSIS — L71.9 ROSACEA: Primary | ICD-10-CM

## 2025-01-07 RX ORDER — AZELAIC ACID 0.2 G/G
1 CREAM CUTANEOUS 2 TIMES DAILY
Qty: 30 G | Refills: 0 | Status: SHIPPED | OUTPATIENT
Start: 2025-01-07

## 2025-01-28 ENCOUNTER — HOSPITAL ENCOUNTER (OUTPATIENT)
Dept: MAMMOGRAPHY | Facility: HOSPITAL | Age: 50
Discharge: HOME | End: 2025-01-28
Payer: COMMERCIAL

## 2025-01-28 ENCOUNTER — HOSPITAL ENCOUNTER (OUTPATIENT)
Dept: ULTRASOUND IMAGING | Facility: HOSPITAL | Age: 50
Discharge: HOME | End: 2025-01-28
Payer: COMMERCIAL

## 2025-01-28 DIAGNOSIS — D24.9 FIBROADENOMA OF BREAST, UNSPECIFIED LATERALITY: ICD-10-CM

## 2025-01-28 DIAGNOSIS — R92.8 ABNORMAL MAMMOGRAM OF BOTH BREASTS: ICD-10-CM

## 2025-01-28 PROCEDURE — 77066 DX MAMMO INCL CAD BI: CPT

## 2025-01-28 PROCEDURE — G0279 TOMOSYNTHESIS, MAMMO: HCPCS

## 2025-02-18 ENCOUNTER — OFFICE VISIT (OUTPATIENT)
Dept: FAMILY MEDICINE CLINIC | Facility: CLINIC | Age: 50
End: 2025-02-18
Payer: COMMERCIAL

## 2025-02-18 VITALS
SYSTOLIC BLOOD PRESSURE: 122 MMHG | OXYGEN SATURATION: 100 % | HEART RATE: 100 BPM | WEIGHT: 265.8 LBS | RESPIRATION RATE: 20 BRPM | TEMPERATURE: 97.4 F | DIASTOLIC BLOOD PRESSURE: 78 MMHG | BODY MASS INDEX: 41.72 KG/M2 | HEIGHT: 67 IN

## 2025-02-18 DIAGNOSIS — Z01.419 WOMEN'S ANNUAL ROUTINE GYNECOLOGICAL EXAMINATION: Primary | ICD-10-CM

## 2025-02-18 DIAGNOSIS — N92.1 MENORRHAGIA WITH IRREGULAR CYCLE: ICD-10-CM

## 2025-02-18 DIAGNOSIS — R10.2 PELVIC PAIN: ICD-10-CM

## 2025-02-18 DIAGNOSIS — J02.9 SORE THROAT: ICD-10-CM

## 2025-02-18 LAB
EXPIRATION DATE: NORMAL
INTERNAL CONTROL: NORMAL
Lab: NORMAL
S PYO AG THROAT QL: NEGATIVE

## 2025-02-18 PROCEDURE — G0123 SCREEN CERV/VAG THIN LAYER: HCPCS

## 2025-02-18 PROCEDURE — 87880 STREP A ASSAY W/OPTIC: CPT

## 2025-02-18 PROCEDURE — 99214 OFFICE O/P EST MOD 30 MIN: CPT

## 2025-02-18 PROCEDURE — 87624 HPV HI-RISK TYP POOLED RSLT: CPT

## 2025-02-18 NOTE — PROGRESS NOTES
Delores Villarreal presents to [facility] with complaints of sore throat, postnasal drip/drainage, and is here for Pap smear.      History of Present Illness  This is a 49-year-old female who presents to clinic with complaints of sore throat, postnasal drip/drainage, and is here for Pap smear.    Patient states that her upper respiratory symptoms started last night, started with a pretty significant sore throat that is very scratchy and painful, and then this morning when she woke up she has had a lot more drainage than she usually has.  Has a lot of nasal drainage, although it is clear when she goes to blow it out, and states that she would just like to be tested for strep just to make sure that that could not be the culprit.  Patient states otherwise she is not having any other symptoms at this time.  States that she did have flu about 3 weeks ago.  Patient states that she is not having any body aches chills or fever, no shortness of breath or chest pain, and no other major symptoms.    Patient's last Pap smear was 1 year ago performed by myself, was normal at that time.  Since then we did find that patient has a uterine fibroid that was causing her some significant pain back in the fall, states that pain has resolved but states that she does still have pretty heavy menstrual cycles that are very irregular.  States that she actually did follow-up with OB/GYN who dismissed her concerns and told her it would go away or resolve during menopause.  Patient states that she is okay at this time but may want to consider a second opinion if the pain does come back.  Not currently endorsing any vaginal symptoms, no vaginal discharge or irritation.  Patient had mammogram performed last month via PCP.    The following portions of the patient's history were personally reviewed and updated as appropriate: allergies, current medications, past medical history, past surgical history, past family  "history, and past social history.       Objective   Vital Signs:   /78   Pulse 100   Temp 97.4 °F (36.3 °C)   Resp 20   Ht 170.2 cm (67\")   Wt 121 kg (265 lb 12.8 oz)   SpO2 100%   BMI 41.63 kg/m²     Body mass index is 41.63 kg/m².    All labs, imaging, test results, and specialty provider notes reviewed with patient.     Physical Exam  Vitals reviewed.   Constitutional:       Appearance: Normal appearance.   Cardiovascular:      Rate and Rhythm: Normal rate and regular rhythm.      Pulses: Normal pulses.      Heart sounds: Normal heart sounds.   Pulmonary:      Effort: Pulmonary effort is normal.      Breath sounds: Normal breath sounds.   Genitourinary:     Comments: Pap smear performed  Neurological:      General: No focal deficit present.      Mental Status: She is alert and oriented to person, place, and time.                               Assessment and Plan:  Diagnoses and all orders for this visit:    1. Women's annual routine gynecological examination (Primary)  -     IgP, Aptima HPV; Future  -     IgP, Aptima HPV    2. Sore throat  Comments:  Strep negative, discussed could be too early to test for other viral infections, monitor symptoms at home and treat symptomatically.  Orders:  -     POC Rapid Strep A    3. Pelvic pain  Comments:  Continue to monitor with uterine fibroid, consider second opinion via OB/GYN to Dr. Martinez's  Orders:  -     IgP, Aptima HPV; Future  -     IgP, Aptima HPV    4. Menorrhagia with irregular cycle  -     IgP, Aptima HPV; Future  -     IgP, Aptima HPV          Follow Up:  No follow-ups on file.    Patient was given instructions and counseling regarding her condition or for health maintenance advice. Please see specific information pulled into the AVS if appropriate.       "

## 2025-02-20 LAB
CYTOLOGIST CVX/VAG CYTO: NORMAL
CYTOLOGY CVX/VAG DOC CYTO: NORMAL
CYTOLOGY CVX/VAG DOC THIN PREP: NORMAL
DX ICD CODE: NORMAL
HPV I/H RISK 4 DNA CVX QL PROBE+SIG AMP: NEGATIVE
OTHER STN SPEC: NORMAL
SERVICE CMNT-IMP: NORMAL
STAT OF ADQ CVX/VAG CYTO-IMP: NORMAL

## 2025-03-09 DIAGNOSIS — R10.2 PELVIC PAIN: ICD-10-CM

## 2025-03-10 RX ORDER — IBUPROFEN 800 MG/1
800 TABLET, FILM COATED ORAL EVERY 8 HOURS PRN
Qty: 60 TABLET | Refills: 0 | Status: SHIPPED | OUTPATIENT
Start: 2025-03-10

## 2025-04-30 DIAGNOSIS — E53.8 VITAMIN B 12 DEFICIENCY: ICD-10-CM

## 2025-04-30 RX ORDER — MULTIVITAMIN WITH IRON
500 TABLET ORAL DAILY
Qty: 90 TABLET | Refills: 1 | Status: SHIPPED | OUTPATIENT
Start: 2025-04-30

## 2025-05-04 DIAGNOSIS — E53.8 VITAMIN B 12 DEFICIENCY: ICD-10-CM

## 2025-05-05 RX ORDER — MULTIVITAMIN WITH IRON
500 TABLET ORAL DAILY
Qty: 90 TABLET | Refills: 1 | OUTPATIENT
Start: 2025-05-05

## 2025-07-01 ENCOUNTER — LAB (OUTPATIENT)
Dept: LAB | Facility: HOSPITAL | Age: 50
End: 2025-07-01
Payer: COMMERCIAL

## 2025-07-01 ENCOUNTER — OFFICE VISIT (OUTPATIENT)
Dept: FAMILY MEDICINE CLINIC | Facility: CLINIC | Age: 50
End: 2025-07-01
Payer: COMMERCIAL

## 2025-07-01 VITALS
BODY MASS INDEX: 40.81 KG/M2 | DIASTOLIC BLOOD PRESSURE: 80 MMHG | HEIGHT: 67 IN | SYSTOLIC BLOOD PRESSURE: 132 MMHG | WEIGHT: 260 LBS | HEART RATE: 100 BPM | TEMPERATURE: 96.3 F | OXYGEN SATURATION: 93 % | RESPIRATION RATE: 16 BRPM

## 2025-07-01 DIAGNOSIS — Z00.00 ANNUAL PHYSICAL EXAM: Primary | ICD-10-CM

## 2025-07-01 DIAGNOSIS — Z12.31 ENCOUNTER FOR SCREENING MAMMOGRAM FOR MALIGNANT NEOPLASM OF BREAST: ICD-10-CM

## 2025-07-01 DIAGNOSIS — E66.01 MORBID OBESITY WITH BMI OF 40.0-44.9, ADULT: ICD-10-CM

## 2025-07-01 DIAGNOSIS — Z00.00 ANNUAL PHYSICAL EXAM: ICD-10-CM

## 2025-07-01 LAB
ALBUMIN SERPL-MCNC: 4 G/DL (ref 3.5–5.2)
ALBUMIN/GLOB SERPL: 1.2 G/DL
ALP SERPL-CCNC: 79 U/L (ref 39–117)
ALT SERPL W P-5'-P-CCNC: 11 U/L (ref 1–33)
ANION GAP SERPL CALCULATED.3IONS-SCNC: 11 MMOL/L (ref 5–15)
AST SERPL-CCNC: 16 U/L (ref 1–32)
BACTERIA UR QL AUTO: ABNORMAL /HPF
BASOPHILS # BLD AUTO: 0.06 10*3/MM3 (ref 0–0.2)
BASOPHILS NFR BLD AUTO: 0.9 % (ref 0–1.5)
BILIRUB SERPL-MCNC: 0.3 MG/DL (ref 0–1.2)
BILIRUB UR QL STRIP: NEGATIVE
BUN SERPL-MCNC: 12 MG/DL (ref 6–20)
BUN/CREAT SERPL: 11.2 (ref 7–25)
CALCIUM SPEC-SCNC: 9.1 MG/DL (ref 8.6–10.5)
CHLORIDE SERPL-SCNC: 103 MMOL/L (ref 98–107)
CHOLEST SERPL-MCNC: 169 MG/DL (ref 0–200)
CLARITY UR: CLEAR
CO2 SERPL-SCNC: 23 MMOL/L (ref 22–29)
COLOR UR: YELLOW
CREAT SERPL-MCNC: 1.07 MG/DL (ref 0.57–1)
DEPRECATED RDW RBC AUTO: 40.7 FL (ref 37–54)
EGFRCR SERPLBLD CKD-EPI 2021: 64.2 ML/MIN/1.73
EOSINOPHIL # BLD AUTO: 0.17 10*3/MM3 (ref 0–0.4)
EOSINOPHIL NFR BLD AUTO: 2.7 % (ref 0.3–6.2)
ERYTHROCYTE [DISTWIDTH] IN BLOOD BY AUTOMATED COUNT: 12.6 % (ref 12.3–15.4)
FERRITIN SERPL-MCNC: 58.7 NG/ML (ref 13–150)
GLOBULIN UR ELPH-MCNC: 3.4 GM/DL
GLUCOSE SERPL-MCNC: 104 MG/DL (ref 65–99)
GLUCOSE UR STRIP-MCNC: NEGATIVE MG/DL
HBA1C MFR BLD: 5.4 % (ref 4.8–5.6)
HCT VFR BLD AUTO: 40.9 % (ref 34–46.6)
HDLC SERPL-MCNC: 34 MG/DL (ref 40–60)
HGB BLD-MCNC: 13.8 G/DL (ref 12–15.9)
HGB UR QL STRIP.AUTO: ABNORMAL
HYALINE CASTS UR QL AUTO: ABNORMAL /LPF
IMM GRANULOCYTES # BLD AUTO: 0.02 10*3/MM3 (ref 0–0.05)
IMM GRANULOCYTES NFR BLD AUTO: 0.3 % (ref 0–0.5)
IRON 24H UR-MRATE: 56 MCG/DL (ref 37–145)
IRON SATN MFR SERPL: 15 % (ref 20–50)
KETONES UR QL STRIP: NEGATIVE
LDLC SERPL CALC-MCNC: 114 MG/DL (ref 0–100)
LDLC/HDLC SERPL: 3.29 {RATIO}
LEUKOCYTE ESTERASE UR QL STRIP.AUTO: NEGATIVE
LYMPHOCYTES # BLD AUTO: 1.74 10*3/MM3 (ref 0.7–3.1)
LYMPHOCYTES NFR BLD AUTO: 27.4 % (ref 19.6–45.3)
MCH RBC QN AUTO: 29.8 PG (ref 26.6–33)
MCHC RBC AUTO-ENTMCNC: 33.7 G/DL (ref 31.5–35.7)
MCV RBC AUTO: 88.3 FL (ref 79–97)
MONOCYTES # BLD AUTO: 0.42 10*3/MM3 (ref 0.1–0.9)
MONOCYTES NFR BLD AUTO: 6.6 % (ref 5–12)
NEUTROPHILS NFR BLD AUTO: 3.93 10*3/MM3 (ref 1.7–7)
NEUTROPHILS NFR BLD AUTO: 62.1 % (ref 42.7–76)
NITRITE UR QL STRIP: NEGATIVE
NRBC BLD AUTO-RTO: 0 /100 WBC (ref 0–0.2)
PH UR STRIP.AUTO: 6 [PH] (ref 5–8)
PLATELET # BLD AUTO: 339 10*3/MM3 (ref 140–450)
PMV BLD AUTO: 10.6 FL (ref 6–12)
POTASSIUM SERPL-SCNC: 4 MMOL/L (ref 3.5–5.2)
PROT SERPL-MCNC: 7.4 G/DL (ref 6–8.5)
PROT UR QL STRIP: NEGATIVE
RBC # BLD AUTO: 4.63 10*6/MM3 (ref 3.77–5.28)
RBC # UR STRIP: ABNORMAL /HPF
REF LAB TEST METHOD: ABNORMAL
SODIUM SERPL-SCNC: 137 MMOL/L (ref 136–145)
SP GR UR STRIP: 1.01 (ref 1–1.03)
SQUAMOUS #/AREA URNS HPF: ABNORMAL /HPF
TIBC SERPL-MCNC: 384 MCG/DL (ref 298–536)
TRANSFERRIN SERPL-MCNC: 258 MG/DL (ref 200–360)
TRIGL SERPL-MCNC: 116 MG/DL (ref 0–150)
TSH SERPL DL<=0.05 MIU/L-ACNC: 2.49 UIU/ML (ref 0.27–4.2)
UROBILINOGEN UR QL STRIP: ABNORMAL
VLDLC SERPL-MCNC: 21 MG/DL (ref 5–40)
WBC # UR STRIP: ABNORMAL /HPF
WBC NRBC COR # BLD AUTO: 6.34 10*3/MM3 (ref 3.4–10.8)

## 2025-07-01 PROCEDURE — 85025 COMPLETE CBC W/AUTO DIFF WBC: CPT

## 2025-07-01 PROCEDURE — 82728 ASSAY OF FERRITIN: CPT

## 2025-07-01 PROCEDURE — 81001 URINALYSIS AUTO W/SCOPE: CPT

## 2025-07-01 PROCEDURE — 84443 ASSAY THYROID STIM HORMONE: CPT

## 2025-07-01 PROCEDURE — 80061 LIPID PANEL: CPT

## 2025-07-01 PROCEDURE — 84466 ASSAY OF TRANSFERRIN: CPT

## 2025-07-01 PROCEDURE — 83540 ASSAY OF IRON: CPT

## 2025-07-01 PROCEDURE — 80053 COMPREHEN METABOLIC PANEL: CPT

## 2025-07-01 PROCEDURE — 83036 HEMOGLOBIN GLYCOSYLATED A1C: CPT

## 2025-07-01 PROCEDURE — 36415 COLL VENOUS BLD VENIPUNCTURE: CPT

## 2025-07-01 RX ORDER — SPIRONOLACTONE 50 MG/1
50 TABLET, FILM COATED ORAL DAILY
COMMUNITY

## 2025-07-01 RX ORDER — MECOBALAMIN 5000 MCG
LOZENGE ORAL DAILY
COMMUNITY

## 2025-07-01 NOTE — PROGRESS NOTES
"Chief Complaint  Annual physical    Subjective         Delores Villarreal is a 50 y.o. female who presents to [facility]    50 years old comes to the clinic today for annual physical.    Patient is currently on spironolactone and vitamin B12.    Has uterine fibroid, seen by OB/GYN in the past.  Does report 2 days of heavy menstrual cycles but no other significant bleeding.  Does have some on and off pelvic pain due to fibroid but denies any blood in stool or urine or any lower urinary tract symptoms.    Physically active otherwise, working on the weight.    12+ review of systems are unremarkable.    Due for screening mammogram, up-to-date with Pap smear and Cologuard        Objective   Vital Signs:   Vitals:    07/01/25 0707   BP: 132/80   Pulse: 100   Resp: 16   Temp: 96.3 °F (35.7 °C)   TempSrc: Temporal   SpO2: 93%   Weight: 118 kg (260 lb)   Height: 170.2 cm (67\")   PainSc: 0-No pain      Body mass index is 40.72 kg/m².   Wt Readings from Last 3 Encounters:   07/01/25 118 kg (260 lb)   02/18/25 121 kg (265 lb 12.8 oz)   10/30/24 120 kg (264 lb)      BP Readings from Last 3 Encounters:   07/01/25 132/80   02/18/25 122/78   10/30/24 143/83        Patient Care Team:  Marsha Witt MD as PCP - General (Family Medicine)     Physical Exam  Vitals reviewed.   Constitutional:       Appearance: Normal appearance. She is well-developed.   HENT:      Head: Normocephalic and atraumatic.      Right Ear: External ear normal.      Left Ear: External ear normal.      Mouth/Throat:      Pharynx: No oropharyngeal exudate.   Eyes:      Conjunctiva/sclera: Conjunctivae normal.      Pupils: Pupils are equal, round, and reactive to light.   Cardiovascular:      Rate and Rhythm: Normal rate and regular rhythm.      Heart sounds: No murmur heard.     No friction rub. No gallop.   Pulmonary:      Effort: Pulmonary effort is normal.      Breath sounds: Normal breath sounds. No wheezing or rhonchi. "   Abdominal:      General: Bowel sounds are normal. There is no distension.      Palpations: Abdomen is soft.      Tenderness: There is no abdominal tenderness.   Skin:     General: Skin is warm and dry.   Neurological:      Mental Status: She is alert and oriented to person, place, and time.      Cranial Nerves: No cranial nerve deficit.   Psychiatric:         Mood and Affect: Mood and affect normal.         Behavior: Behavior normal.         Thought Content: Thought content normal.         Judgment: Judgment normal.          Class 3 Severe Obesity (BMI >=40). Obesity-related health conditions include the following: obstructive sleep apnea, hypertension, and coronary heart disease. Obesity is unchanged. BMI is is above average; BMI management plan is completed. We discussed portion control and increasing exercise.              Assessment and Plan   Diagnoses and all orders for this visit:    1. Annual physical exam (Primary)  Comments:  Lifestyle modifications discussed.  Preventive care is reviewed, blood work ordered.  Medications reviewed  Orders:  -     TSH Rfx On Abnormal To Free T4; Future  -     CBC & Differential; Future  -     Comprehensive Metabolic Panel; Future  -     Hemoglobin A1c; Future  -     Lipid Panel; Future  -     Urinalysis With Microscopic - Urine, Clean Catch; Future  -     Iron Profile + Ferritin ( only); Future    2. Encounter for screening mammogram for malignant neoplasm of breast  -     Mammo Screening Digital Tomosynthesis Bilateral With CAD; Future  -     TSH Rfx On Abnormal To Free T4; Future  -     CBC & Differential; Future  -     Comprehensive Metabolic Panel; Future  -     Hemoglobin A1c; Future  -     Lipid Panel; Future  -     Urinalysis With Microscopic - Urine, Clean Catch; Future  -     Iron Profile + Ferritin ( only); Future    3. Morbid obesity with BMI of 40.0-44.9, adult  Comments:  Daily exercise and healthy diet recommended  Orders:  -     Iron Profile + Ferritin  ( only); Future          Tobacco Use: Low Risk  (7/1/2025)    Patient History    • Smoking Tobacco Use: Never    • Smokeless Tobacco Use: Never    • Passive Exposure: Never            Follow Up   Return in about 1 year (around 7/8/2026) for Annual physical.  Patient was given instructions and counseling regarding her condition or for health maintenance advice. Please see specific information pulled into the AVS if appropriate.

## 2025-07-22 ENCOUNTER — HOSPITAL ENCOUNTER (OUTPATIENT)
Dept: MAMMOGRAPHY | Facility: HOSPITAL | Age: 50
Discharge: HOME | End: 2025-07-22
Payer: COMMERCIAL

## 2025-07-22 DIAGNOSIS — Z12.31 ENCOUNTER FOR SCREENING MAMMOGRAM FOR MALIGNANT NEOPLASM OF BREAST: ICD-10-CM
